# Patient Record
Sex: MALE | Race: WHITE | NOT HISPANIC OR LATINO
[De-identification: names, ages, dates, MRNs, and addresses within clinical notes are randomized per-mention and may not be internally consistent; named-entity substitution may affect disease eponyms.]

---

## 2020-12-21 ENCOUNTER — ERX REFILL RESPONSE (OUTPATIENT)
Age: 45
End: 2020-12-21

## 2020-12-21 RX ORDER — PANTOPRAZOLE SODIUM 40 MG/1
TAKE 1 TABLET BY MOUTH EVERY DAY TABLET, DELAYED RELEASE ORAL
Qty: 30 | Refills: 8

## 2024-07-12 ENCOUNTER — TELEPHONE ENCOUNTER (OUTPATIENT)
Dept: URBAN - METROPOLITAN AREA CLINIC 126 | Facility: CLINIC | Age: 49
End: 2024-07-12

## 2024-07-16 ENCOUNTER — CLAIMS CREATED FROM THE CLAIM WINDOW (OUTPATIENT)
Dept: URBAN - METROPOLITAN AREA SURGERY CENTER 19 | Facility: SURGERY CENTER | Age: 49
End: 2024-07-16
Payer: COMMERCIAL

## 2024-07-16 ENCOUNTER — CLAIMS CREATED FROM THE CLAIM WINDOW (OUTPATIENT)
Dept: URBAN - METROPOLITAN AREA CLINIC 4 | Facility: CLINIC | Age: 49
End: 2024-07-16
Payer: COMMERCIAL

## 2024-07-16 DIAGNOSIS — D12.8 BENIGN NEOPLASM OF RECTUM: ICD-10-CM

## 2024-07-16 DIAGNOSIS — D12.4 BENIGN NEOPLASM OF DESCENDING COLON: ICD-10-CM

## 2024-07-16 DIAGNOSIS — Z12.11 ENCOUNTER FOR SCREENING FOR MALIGNANT NEOPLASM OF COLON: ICD-10-CM

## 2024-07-16 DIAGNOSIS — Z12.11 COLON CANCER SCREENING: ICD-10-CM

## 2024-07-16 PROCEDURE — 88305 TISSUE EXAM BY PATHOLOGIST: CPT | Performed by: PATHOLOGY

## 2024-07-16 PROCEDURE — 45385 COLONOSCOPY W/LESION REMOVAL: CPT | Performed by: INTERNAL MEDICINE

## 2024-07-16 PROCEDURE — 45380 COLONOSCOPY AND BIOPSY: CPT | Performed by: INTERNAL MEDICINE

## 2024-07-16 PROCEDURE — 00812 ANES LWR INTST SCR COLSC: CPT | Performed by: NURSE ANESTHETIST, CERTIFIED REGISTERED

## 2024-08-23 ENCOUNTER — TELEPHONE ENCOUNTER (OUTPATIENT)
Dept: URBAN - METROPOLITAN AREA CLINIC 126 | Facility: CLINIC | Age: 49
End: 2024-08-23

## 2024-08-27 ENCOUNTER — OFFICE VISIT (OUTPATIENT)
Dept: URBAN - METROPOLITAN AREA CLINIC 80 | Facility: CLINIC | Age: 49
End: 2024-08-27
Payer: COMMERCIAL

## 2024-08-27 ENCOUNTER — LAB OUTSIDE AN ENCOUNTER (OUTPATIENT)
Dept: URBAN - METROPOLITAN AREA CLINIC 80 | Facility: CLINIC | Age: 49
End: 2024-08-27

## 2024-08-27 ENCOUNTER — DASHBOARD ENCOUNTERS (OUTPATIENT)
Age: 49
End: 2024-08-27

## 2024-08-27 VITALS
TEMPERATURE: 97.2 F | BODY MASS INDEX: 24.77 KG/M2 | DIASTOLIC BLOOD PRESSURE: 90 MMHG | HEART RATE: 73 BPM | HEIGHT: 70 IN | SYSTOLIC BLOOD PRESSURE: 142 MMHG | WEIGHT: 173 LBS

## 2024-08-27 DIAGNOSIS — R12 HEARTBURN: ICD-10-CM

## 2024-08-27 DIAGNOSIS — R93.5 ABDOMINAL ULTRASOUND, ABNORMAL: ICD-10-CM

## 2024-08-27 DIAGNOSIS — K86.1 ACUTE ON CHRONIC PANCREATITIS: ICD-10-CM

## 2024-08-27 PROCEDURE — 99213 OFFICE O/P EST LOW 20 MIN: CPT | Performed by: PHYSICIAN ASSISTANT

## 2024-08-27 RX ORDER — PANTOPRAZOLE SODIUM 40 MG/1
TAKE 1 TABLET BY MOUTH EVERY DAY TABLET, DELAYED RELEASE ORAL
Qty: 30 | Refills: 8 | Status: ACTIVE | COMMUNITY

## 2024-08-27 RX ORDER — PANTOPRAZOLE SODIUM 20 MG/1
1 TABLET TABLET, DELAYED RELEASE ORAL ONCE A DAY
Qty: 90 TABLET | Refills: 3 | OUTPATIENT
Start: 2024-08-27

## 2024-08-27 NOTE — HPI-ZZZTODAY'S VISIT
Patient had colonoscopy on July 16, 2020 for with 2 polyps noted.  Pathology showed tubular adenomas and it was recommended to repeat the colonoscopy in 5 years.  Had a CT scan on August 16, 2024 showing small sliding hiatal hernia, pancreas exhibits considerable fatty replacement for in excess of what one would expect for his age, there is an indistinctness of the head and body has a beady appearance, pancreatic duct is not clearly visible, no pancreatic calcifications and no peripancreatic inflammatory change.  There is no peripancreatic effusions and no pancreatic mass or pseudocyst.  Appearance is one of atrophy and probable chronic pancreatitis.  He had his Bday on [birth year] - had cake, bar-b-q, couple drinks - next day his stomach felt out of whack - not abnormal for him - has a sensitive stomach anyways - called his PCP - had a ct scan done to make sure ok - and the above is what they found - saw PCP last week  Pt has never been diagnosed with pancreatitis in his past  Pt takes Pantoprazole 40mg a day for heartburn - has for years

## 2024-08-30 ENCOUNTER — TELEPHONE ENCOUNTER (OUTPATIENT)
Dept: URBAN - METROPOLITAN AREA CLINIC 80 | Facility: CLINIC | Age: 49
End: 2024-08-30

## 2024-08-30 LAB
ANA SCREEN, IFA: NEGATIVE
CYCLIC CITRULLINATED PEPTIDE (CCP) AB (IGG): <16
IGG, SUBCLASS 4: 36.8
INTERPRETATION: (no result)
RHEUMATOID FACTOR: <10

## 2024-09-11 ENCOUNTER — TELEPHONE ENCOUNTER (OUTPATIENT)
Dept: URBAN - METROPOLITAN AREA CLINIC 23 | Facility: CLINIC | Age: 49
End: 2024-09-11

## 2024-09-27 ENCOUNTER — TELEPHONE ENCOUNTER (OUTPATIENT)
Dept: URBAN - METROPOLITAN AREA CLINIC 80 | Facility: CLINIC | Age: 49
End: 2024-09-27

## 2024-09-27 ENCOUNTER — OFFICE VISIT (OUTPATIENT)
Dept: URBAN - METROPOLITAN AREA MEDICAL CENTER 18 | Facility: MEDICAL CENTER | Age: 49
End: 2024-09-27

## 2024-10-08 ENCOUNTER — OFFICE VISIT (OUTPATIENT)
Dept: URBAN - METROPOLITAN AREA CLINIC 2 | Facility: CLINIC | Age: 49
End: 2024-10-08

## 2024-10-11 ENCOUNTER — OFFICE VISIT (OUTPATIENT)
Dept: URBAN - METROPOLITAN AREA TELEHEALTH 2 | Facility: TELEHEALTH | Age: 49
End: 2024-10-11

## 2024-10-21 ENCOUNTER — OFFICE VISIT (OUTPATIENT)
Dept: URBAN - METROPOLITAN AREA MEDICAL CENTER 28 | Facility: MEDICAL CENTER | Age: 49
End: 2024-10-21
Payer: COMMERCIAL

## 2024-10-21 DIAGNOSIS — R10.12 ABDOMINAL PAIN, LEFT UPPER QUADRANT: ICD-10-CM

## 2024-10-21 DIAGNOSIS — R93.3 ABN FINDINGS-GI TRACT: ICD-10-CM

## 2024-10-21 DIAGNOSIS — K86.89 OTHER SPECIFIED DISEASES OF PANCREAS: ICD-10-CM

## 2024-10-21 DIAGNOSIS — K29.50 CHRONIC GASTRITIS: ICD-10-CM

## 2024-10-21 PROCEDURE — 43242 EGD US FINE NEEDLE BX/ASPIR: CPT | Performed by: STUDENT IN AN ORGANIZED HEALTH CARE EDUCATION/TRAINING PROGRAM

## 2024-10-21 PROCEDURE — 43239 EGD BIOPSY SINGLE/MULTIPLE: CPT | Performed by: STUDENT IN AN ORGANIZED HEALTH CARE EDUCATION/TRAINING PROGRAM

## 2024-10-21 RX ORDER — PANTOPRAZOLE SODIUM 40 MG/1
TAKE 1 TABLET BY MOUTH EVERY DAY TABLET, DELAYED RELEASE ORAL
Qty: 30 | Refills: 8 | Status: ACTIVE | COMMUNITY

## 2024-10-21 RX ORDER — PANTOPRAZOLE SODIUM 20 MG/1
1 TABLET TABLET, DELAYED RELEASE ORAL ONCE A DAY
Qty: 90 TABLET | Refills: 3 | Status: ACTIVE | COMMUNITY
Start: 2024-08-27

## 2024-10-21 NOTE — HPI-TODAY'S VISIT:
Had a CT scan on August 16, 2024 showing small sliding hiatal hernia, pancreas exhibits considerable fatty replacement for in excess of what one would expect for his age, there is an indistinctness of the head and body has a beady appearance, pancreatic duct is not clearly visible, no pancreatic calcifications and no peripancreatic inflammatory change. There is no peripancreatic effusions and no pancreatic mass or pseudocyst. Appearance is one of atrophy and probable chronic pancreatitis.  Labs with normal LFTs, ESR, CRP, lipase, amylase, GREGG, and IgG4

## 2024-10-23 ENCOUNTER — TELEPHONE ENCOUNTER (OUTPATIENT)
Dept: URBAN - METROPOLITAN AREA CLINIC 80 | Facility: CLINIC | Age: 49
End: 2024-10-23

## 2024-10-25 ENCOUNTER — OFFICE VISIT (OUTPATIENT)
Dept: URBAN - METROPOLITAN AREA MEDICAL CENTER 18 | Facility: MEDICAL CENTER | Age: 49
End: 2024-10-25

## 2024-11-12 ENCOUNTER — OFFICE VISIT (OUTPATIENT)
Dept: URBAN - METROPOLITAN AREA CLINIC 80 | Facility: CLINIC | Age: 49
End: 2024-11-12
Payer: COMMERCIAL

## 2024-11-12 VITALS
HEART RATE: 78 BPM | TEMPERATURE: 97.7 F | DIASTOLIC BLOOD PRESSURE: 79 MMHG | HEIGHT: 70 IN | WEIGHT: 170.8 LBS | SYSTOLIC BLOOD PRESSURE: 136 MMHG | BODY MASS INDEX: 24.45 KG/M2

## 2024-11-12 DIAGNOSIS — R12 HEARTBURN: ICD-10-CM

## 2024-11-12 DIAGNOSIS — R93.5 ABNORMAL ABDOMINAL CT SCAN: ICD-10-CM

## 2024-11-12 DIAGNOSIS — K86.89 FATTY PANCREAS: ICD-10-CM

## 2024-11-12 PROCEDURE — 99213 OFFICE O/P EST LOW 20 MIN: CPT | Performed by: PHYSICIAN ASSISTANT

## 2024-11-12 RX ORDER — PANTOPRAZOLE SODIUM 40 MG/1
TAKE 1 TABLET BY MOUTH EVERY DAY TABLET, DELAYED RELEASE ORAL
Qty: 30 | Refills: 8 | Status: DISCONTINUED | COMMUNITY

## 2024-11-12 RX ORDER — PANTOPRAZOLE SODIUM 20 MG/1
1 TABLET TABLET, DELAYED RELEASE ORAL ONCE A DAY
Qty: 90 TABLET | Refills: 3 | Status: ACTIVE | COMMUNITY
Start: 2024-08-27

## 2024-11-12 NOTE — HPI-ZZZTODAY'S VISIT
Patient had endoscopic ultrasound on October 21, 2024.  Showed normal esophagus, normal Z-line and normal stomach.  Normal duodenal bulb and second portion of the duodenum.  No evidence of significant pathology in the visualized portion of the liver.  Abdominal aorta and celiac trunk or endosonographically normal.  Pancreatic parenchymal abnormalities consisting of prominent, hyperechoic foci, and heterogeneity were noted in the entire pancreas.  Fine-needle aspiration was done.  Pathology showed no malignant cells seen in the smear showed a few scattered bland pancreaticobiliary ductal epithelial cells and rare pancreatic acinar epithelial cells in a background of blood.  The biopsy she took of the stomach just showed mild chronic gastritis.  Dr. Tomas felt that the samples were nondiagnostic for autoimmune pancreatitis even though the endoscopic appearance was certainly possible. no abd pain no nausea or emesis he does have some side pains by his rib cage no episodes like he did before